# Patient Record
Sex: FEMALE | Race: WHITE | NOT HISPANIC OR LATINO | Employment: UNEMPLOYED | URBAN - METROPOLITAN AREA
[De-identification: names, ages, dates, MRNs, and addresses within clinical notes are randomized per-mention and may not be internally consistent; named-entity substitution may affect disease eponyms.]

---
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
## 2020-06-08 PROBLEM — S42.114A CLOSED NONDISPLACED FRACTURE OF BODY OF RIGHT SCAPULA: Status: ACTIVE | Noted: 2020-06-07

## 2020-06-08 PROBLEM — S09.93XA FACIAL TRAUMA, INITIAL ENCOUNTER: Status: ACTIVE | Noted: 2020-06-07

## 2020-06-08 PROBLEM — S22.39XA RIB FRACTURE: Status: ACTIVE | Noted: 2020-06-08

## 2020-06-08 PROBLEM — S06.5XAA SUBDURAL HEMATOMA, ACUTE: Status: ACTIVE | Noted: 2020-06-08

## 2020-06-08 PROBLEM — T07.XXXA ABRASIONS OF MULTIPLE SITES: Status: ACTIVE | Noted: 2020-06-07

## 2020-06-09 PROBLEM — S69.91XA INJURY OF FINGER OF RIGHT HAND: Status: ACTIVE | Noted: 2020-06-09

## 2020-06-09 PROBLEM — S69.92XA INJURY OF FINGER OF LEFT HAND: Status: ACTIVE | Noted: 2020-06-09

## 2020-06-10 PROBLEM — S19.83XA: Status: ACTIVE | Noted: 2020-06-10

## 2020-06-10 PROBLEM — R49.0 DYSPHONIA: Status: ACTIVE | Noted: 2020-06-10

## 2020-06-11 PROBLEM — S01.81XA FACIAL LACERATION: Status: ACTIVE | Noted: 2020-06-07

## 2020-06-21 ENCOUNTER — NURSE TRIAGE (OUTPATIENT)
Dept: ADMINISTRATIVE | Facility: CLINIC | Age: 55
End: 2020-06-21

## 2020-06-21 NOTE — TELEPHONE ENCOUNTER
Post Procedural Symptom Tracker. Pt is currently an inpatient. No contact made. No follow up needed.    Reason for Disposition   Caller has already spoken to PCP or another triager   Caller has already spoken with the PCP and has no further questions.    Protocols used: INFORMATION ONLY CALL-A-AH, NO CONTACT OR DUPLICATE CONTACT CALL-A-AH